# Patient Record
Sex: FEMALE | Race: WHITE | Employment: OTHER | URBAN - METROPOLITAN AREA
[De-identification: names, ages, dates, MRNs, and addresses within clinical notes are randomized per-mention and may not be internally consistent; named-entity substitution may affect disease eponyms.]

---

## 2020-07-14 ENCOUNTER — APPOINTMENT (OUTPATIENT)
Dept: PHYSICAL THERAPY | Age: 60
End: 2020-07-14

## 2022-06-29 ENCOUNTER — APPOINTMENT (OUTPATIENT)
Dept: PHYSICAL THERAPY | Age: 62
End: 2022-06-29

## 2022-07-06 ENCOUNTER — APPOINTMENT (OUTPATIENT)
Dept: PHYSICAL THERAPY | Age: 62
End: 2022-07-06
Payer: OTHER GOVERNMENT

## 2022-07-11 ENCOUNTER — HOSPITAL ENCOUNTER (OUTPATIENT)
Dept: PHYSICAL THERAPY | Age: 62
Discharge: HOME | End: 2022-07-11
Payer: OTHER GOVERNMENT

## 2022-07-11 PROCEDURE — 97014 ELECTRIC STIMULATION THERAPY: CPT | Performed by: PHYSICAL THERAPIST

## 2022-07-11 PROCEDURE — 97162 PT EVAL MOD COMPLEX 30 MIN: CPT | Performed by: PHYSICAL THERAPIST

## 2022-07-11 PROCEDURE — 97535 SELF CARE MNGMENT TRAINING: CPT | Performed by: PHYSICAL THERAPIST

## 2022-07-11 NOTE — PROGRESS NOTES
PT DAILY TREATMENT NOTE     Patient Name: Mery Garces  Date:2022  : 1960  [x]  Patient  Verified  Payor: JOAO / Plan: Eliu Ra / Product Type: [employer] /    In time:917  Out time:1014  Total Treatment Time (min): 62  Visit #: 1 of     Treatment Area: Carpal tunnel syndrome, right upper limb [G56.01]    SUBJECTIVE  Pain Level (0-10 scale): 2/10 R wrist 5/10 R elbow  Any medication changes, allergies to medications, adverse drug reactions, diagnosis change, or new procedure performed?: [x] No    [] Yes (see summary sheet for update)  Subjective functional status/changes:   [] No changes reported  Pt is a 63year old female with subjective complaints of R wrist pain following a carpal tunnel release in 2020. She notes that when the stitches came out she had intense pain and burning sensation in her R wrist and hand and then at the same pain she started developing R elbow pain after painting. Currently pain in her R wrist a 2/10 and a 5/10 in her R elbow. Functional limitations include she has pain in the middle of the night, in the morning is worse, rowing, gripping, and lifting. She denies anything that really helps her pain. She has not had recent imaging studies. Negative for red flags. FOTO: 57/100.      OBJECTIVE    Modality rationale: decrease inflammation, decrease pain and increase tissue extensibility to improve the patients ability to improve post session soreness    Min Type Additional Details   15 [x] Estim:  [x]Unatt       []IFC  [x]Premod                        []Other:  [x]w/ice   []w/heat  Position: seated  Location: r wrist extensor wad    [] Estim: []Att    []TENS instruct  []NMES                    []Other:  []w/US   []w/ice   []w/heat  Position:  Location:    []  Traction: [] Cervical       []Lumbar                       [] Prone          []Supine                       []Intermittent   []Continuous Lbs:  [] before manual  [] after manual []  Ultrasound: []Continuous   [] Pulsed                           []1MHz   []3MHz W/cm2:  Location:    []  Iontophoresis with dexamethasone         Location: [] Take home patch   [] In clinic    []  Ice     []  heat  []  Ice massage  []  Laser   []  Anodyne Position:  Location:    []  Laser with stim  []  Other:  Position:  Location:    []  Vasopneumatic Device  Pre-treatment girth:   Post-treatment girth:   Measured at landmark location:  Pressure:       [] lo [] med [] hi   Temperature: [] lo [] med [] hi   [] Skin assessment post-treatment:  []intact []redness- no adverse reaction    []redness - adverse reaction:     20 min [x]Eval                  []Re-Eval       10 min Manual Therapy:  DTM to the R wrist extensor wad with passive stretch   The manual therapy interventions were performed at a separate and distinct time from the therapeutic activities interventions. Rationale: decrease pain, increase tissue extensibility and decrease trigger points to improve    12 min Self Care: Pt educated on type and wear protocol for wrist extension splint to wear when sleeping or performing knitting   Rationale:    to decrease pain to improve the patients ability to hobbies without increased stress to the R elbow/pain          With   [] TE   [] TA   [] neuro   [] other: Patient Education: [x] Review HEP    [] Progressed/Changed HEP based on:   [] positioning   [] body mechanics   [] transfers   [] heat/ice application    [] other:      Other Objective/Functional Measures:   Upon evaluation, pt sits with good posture. AROM of the R elbow is full with pain at end range. R wrist AROM is WNL. R wrist strength: 4/5.  strength: R: 65.4# : 76.1#. Noted severe pain over the R epicondyle and wrist extensor wad.         Fall Risk Assessment: Does the patient have a fear of falling? no    Pain Level (0-10 scale) post treatment: stiff    ASSESSMENT/Changes in Function:  [x]  See Plan of Care  []  See progress note/recertification  []  See Discharge Summary         Progress towards goals / Updated goals:  PER POC     PLAN  []  Upgrade activities as tolerated     [x]  Continue plan of care  []  Update interventions per flow sheet       []  Discharge due to:_  [x]  Other: PT 2-3x/week for 4 weeks    Justification for Eval Code Complexity:  Patient History : chronicity, neck pain, CTS   Examination see exam   Clinical Presentation: evolving  Clinical Decision Making : FOTO : 62 /100       Blessing Kay DPT 7/11/2022  9:21 AM    Future Appointments   Date Time Provider Boo Lock   7/19/2022 10:45 AM Pat Sargent PTA [facility]

## 2022-07-13 NOTE — PROGRESS NOTES
[address] [facility] AT [facility]  [address] [address],[address], [place], [place]  Phone [phone]  Fax [phone] / [address] [address] PHYSICAL THERAPY SERVICES  Patient Name: Kristan Boyd : 1960   Medical   Diagnosis: Carpal tunnel syndrome, right upper limb [G56.01] Treatment Diagnosis: R elbow/wrist pain   Onset Date:      Referral Source: Divya Coyle Start of Care [facility]): 2022   Prior Hospitalization: See medical history Provider #: [id number]   Prior Level of Function: IND, RHD, likes to knit/sew   Comorbidities: None reported   Medications: Verified on Patient Summary List   The Plan of Care and following information is based on the information from the initial evaluation.   =================================================================================  Assessment / key information:  Pt is a 63year old female with subjective complaints of R wrist pain following a carpal tunnel release in 2020. She notes that when the stitches came out she had intense pain and burning sensation in her R wrist and hand and then at the same pain she started developing R elbow pain after painting. Currently pain in her R wrist a 2/10 and a 5/10 in her R elbow. Functional limitations include she has pain in the middle of the night, in the morning is worse, rowing, gripping, and lifting. She denies anything that really helps her pain. She has not had recent imaging studies. Negative for red flags. FOTO: 57/100. Upon evaluation, pt sits with good posture. AROM of the R elbow is full with pain at end range. R wrist AROM is WNL. R wrist strength: 4/5.  strength: R: 65.4# : 76.1#. Noted severe pain over the R epicondyle and wrist extensor wad. Pt would benefit from a course of skilled PT to address above deficits to maximize use of the R UE with reduced pain. =================================================================================  Eval Complexity: History: MEDIUM  Complexity : 1-2 comorbidities / personal factors will impact the outcome/ POC Exam:HIGH Complexity : 4+ Standardized tests and measures addressing body structure, function, activity limitation and / or participation in recreation  Presentation: MEDIUM Complexity : Evolving with changing characteristics  Clinical Decision Making:MEDIUM Complexity : FOTO score of 26-74Overall Complexity:MEDIUM  Problem List: pain affecting function, decrease strength, decrease ADL/ functional abilitiies, decrease activity tolerance and decrease flexibility/ joint mobility   Treatment Plan may include any combination of the following: Therapeutic exercise, Therapeutic activities, Neuromuscular re-education, Physical agent/modality, Manual therapy, Patient education and Self Care training  Patient / Family readiness to learn indicated by: asking questions and trying to perform skills  Persons(s) to be included in education: patient (P)  Barriers to Learning/Limitations: None  Measures taken:    Patient Goal (s): Relief   Patient self reported health status: excellent  Rehabilitation Potential: good   Short Term Goals: To be accomplished in  1  weeks:  1. Pt will be IND and compliant with HEP for self-management of symptoms. 2. Pt will be compliant with wearing R wrist brace to reduce overuse with daily activities for pain management.  Long Term Goals: To be accomplished in  4  weeks:  1. Pt will improve R  strength to 80 lbs to improve ability to open a tight jar. 2. Pt will improve R wrist strength to 5/5 to improve ADL tolerance. 3. Pt will report a 50% improvement in pain to be able to perform hobbies without restrictions. 4. Pt will improve FOTO score to at least 71/100 as a functional indicator of improved functional mobility.    Frequency / Duration:   Patient to be seen  2-3  times per week for 4 weeks: (All LTG as above will be assessed and updated every 10 visits or 30 days and progressed as needed)    Patient / Caregiver education and instruction: exercises  Therapist Signature: Rula Monzon, DPT Date: 63/86/2711   Certification Period: NA Time:    ===========================================================================================  I certify that the above Physical Therapy Services are being furnished while the patient is under my care. I agree with the treatment plan and certify that this therapy is necessary. Physician Signature:        Date:       Time:     Please sign and return to [facility] or you may fax the signed copy to [phone]. Thank you.   Nawaf Allen PA-C

## 2022-07-15 ENCOUNTER — HOSPITAL ENCOUNTER (OUTPATIENT)
Dept: PHYSICAL THERAPY | Age: 62
Discharge: HOME | End: 2022-07-15
Payer: OTHER GOVERNMENT

## 2022-07-15 PROCEDURE — 97140 MANUAL THERAPY 1/> REGIONS: CPT

## 2022-07-15 PROCEDURE — 97530 THERAPEUTIC ACTIVITIES: CPT

## 2022-07-15 PROCEDURE — 97110 THERAPEUTIC EXERCISES: CPT

## 2022-07-15 NOTE — PROGRESS NOTES
PT DAILY TREATMENT NOTE     Patient Name: Army Crawford  Date:7/15/2022  : 1960  [x]  Patient  Verified  Payor: JOAO / Plan: Yung Posey 74 / Product Type:  /    In time:916  Out time: 10:08   Total Treatment Time (min): 52   Visit #: 2 of     Treatment Area: Carpal tunnel syndrome, right upper limb [G56.01]    SUBJECTIVE  Pain Level (0-10 scale): 0/10 wrist, 4/10 elbow   Any medication changes, allergies to medications, adverse drug reactions, diagnosis change, or new procedure performed?: [x] No    [] Yes (see summary sheet for update)  Subjective functional status/changes:   [] No changes reported  The patient reports she was performing right wrist extensor stretches. She states she forgot to bring her brace.      OBJECTIVE    Modality rationale: decrease inflammation, decrease pain and increase tissue extensibility to improve the patients ability to improve post session soreness    Min Type Additional Details   x  [x] Estim:  [x]Unatt       []IFC  [x]Premod                        []Other:  [x]w/ice   []w/heat  Position: seated  Location: r wrist extensor wad    [] Estim: []Att    []TENS instruct  []NMES                    []Other:  []w/US   []w/ice   []w/heat  Position:  Location:    []  Traction: [] Cervical       []Lumbar                       [] Prone          []Supine                       []Intermittent   []Continuous Lbs:  [] before manual  [] after manual    []  Ultrasound: []Continuous   [] Pulsed                           []1MHz   []3MHz W/cm2:  Location:    []  Iontophoresis with dexamethasone         Location: [] Take home patch   [] In clinic    []  Ice     []  heat  []  Ice massage  []  Laser   []  Anodyne Position:  Location:    []  Laser with stim  []  Other:  Position:  Location:    []  Vasopneumatic Device  Pre-treatment girth:   Post-treatment girth:   Measured at landmark location:  Pressure:       [] lo [] med [] hi   Temperature: [] lo [] med [] hi [] Skin assessment post-treatment:  []intact []redness- no adverse reaction    []redness - adverse reaction:     21 min Therapeutic Exercise:  [] See flow sheet :  UBE   Wrist extensor stretches   Patient education: anatomy of wrist extensors, tendon healing, k-taping   HEP DEVELOPMENT AND REVIEW    Rationale: increase ROM and increase strength to improve the patients ability to perform household duties     8 min Therapeutic Activity:  []  See flow sheet :  Gripper - supinated and pronated   Wrist PRE   Rationale: increase ROM and increase strength  to improve the patients ability to perform ADL's     x min Neuromuscular Re-education:  []  See flow sheet :   Rationale: increase ROM and increase strength  to improve the patients lifting tolerance     23 min Manual Therapy:  DTM to the R wrist extensor wad with passive stretch; k-tape to right elbow   The manual therapy interventions were performed at a separate and distinct time from the therapeutic activities interventions. Rationale: decrease pain, increase tissue extensibility and decrease trigger points to improve    x min Self Care: Pt educated on type and wear protocol for wrist extension splint to wear when sleeping or performing knitting   Rationale:    to decrease pain to improve the patients ability to hobbies without increased stress to the R elbow/pain          With   [x] TE   [] TA   [] neuro   [] other: Patient Education: [x] Review HEP    [] Progressed/Changed HEP based on:   [] positioning   [] body mechanics   [] transfers   [] heat/ice application    [x] other: anatomy of wrist extensors, tendon healing     Other Objective/Functional Measures:   Initiated per POC         Pain Level (0-10 scale) post treatment: sore    ASSESSMENT/Changes in Function:  The patient presents for first f/u since IE. She was challenged by gripping in pronated position. Increased tenderness throughout extensor wad noted during manual treatment. Updated HEP.  Progress as tolerated nv. Patient will continue to benefit from skilled PT services to modify and progress therapeutic interventions, address functional mobility deficits, address ROM deficits, address strength deficits, analyze and address soft tissue restrictions, analyze and cue movement patterns and analyze and modify body mechanics/ergonomics to attain remaining goals. [x]  See Plan of Care  []  See progress note/recertification  []  See Discharge Summary         Progress towards goals / Updated goals: · Short Term Goals: To be accomplished in  1  weeks:  1. Pt will be IND and compliant with HEP for self-management of symptoms. Current: good compliance with wrist extensor stretches 07/15/2022  2. Pt will be compliant with wearing R wrist brace to reduce overuse with daily activities for pain management. · Long Term Goals: To be accomplished in  4  weeks:  1. Pt will improve R  strength to 80 lbs to improve ability to open a tight jar. 2. Pt will improve R wrist strength to 5/5 to improve ADL tolerance. 3. Pt will report a 50% improvement in pain to be able to perform hobbies without restrictions. 4. Pt will improve FOTO score to at least 71/100 as a functional indicator of improved functional mobility.           PLAN  []  Upgrade activities as tolerated     [x]  Continue plan of care  []  Update interventions per flow sheet       []  Discharge due to:_  [x]  Other: progress as tolerated       Shoshana Lefort, PT 7/15/2022  9:21 AM    Future Appointments   Date Time Provider Boo Lock   7/19/2022  7:00 AM Missy Olmstead DPT [facility]   7/21/2022  7:45 AM Missy Olmstead DPT [facility]   7/26/2022  7:45 AM Missy Olmstead DPT [facility]   7/29/2022  7:00 AM Missy Olmstead DPT [facility]   8/2/2022  7:45 AM Missy Olmstead DPT [facility]   8/5/2022  8:30 AM Missy Olmstead DPT [facility]   8/9/2022  7:45 AM Tawana Boogie Ma, DPT [facility]

## 2022-07-18 ENCOUNTER — APPOINTMENT (OUTPATIENT)
Dept: PHYSICAL THERAPY | Age: 62
End: 2022-07-18
Payer: OTHER GOVERNMENT

## 2022-07-19 ENCOUNTER — APPOINTMENT (OUTPATIENT)
Dept: PHYSICAL THERAPY | Age: 62
End: 2022-07-19
Payer: OTHER GOVERNMENT

## 2022-07-21 ENCOUNTER — HOSPITAL ENCOUNTER (OUTPATIENT)
Dept: PHYSICAL THERAPY | Age: 62
Discharge: HOME | End: 2022-07-21
Payer: OTHER GOVERNMENT

## 2022-07-21 PROCEDURE — 97140 MANUAL THERAPY 1/> REGIONS: CPT | Performed by: PHYSICAL THERAPIST

## 2022-07-21 PROCEDURE — 97110 THERAPEUTIC EXERCISES: CPT | Performed by: PHYSICAL THERAPIST

## 2022-07-21 PROCEDURE — 97035 APP MDLTY 1+ULTRASOUND EA 15: CPT | Performed by: PHYSICAL THERAPIST

## 2022-07-21 NOTE — PROGRESS NOTES
PT DAILY TREATMENT NOTE     Patient Name: James Ort  Date:2022  : 1960  [x]  Patient  Verified  Payor: JOAO / Plan: Yung Posey 74 / Product Type:  /    In time:745  Out time: 847  Total Treatment Time (min): 62  Visit #: 3 of     Treatment Area: Carpal tunnel syndrome, right upper limb [G56.01]    SUBJECTIVE  Pain Level (0-10 scale): 0/10 wrist, 4/10 elbow   Any medication changes, allergies to medications, adverse drug reactions, diagnosis change, or new procedure performed?: [x] No    [] Yes (see summary sheet for update)  Subjective functional status/changes:   [] No changes reported  Pt reports that her elbow still hurts. She notes that her wrist is feeling good.       OBJECTIVE    Modality rationale: decrease inflammation, decrease pain and increase tissue extensibility to improve the patients ability to improve post session soreness    Min Type Additional Details   x  [x] Estim:  [x]Unatt       []IFC  [x]Premod                        []Other:  [x]w/ice   []w/heat  Position: seated  Location: r wrist extensor wad    [] Estim: []Att    []TENS instruct  []NMES                    []Other:  []w/US   []w/ice   []w/heat  Position:  Location:    []  Traction: [] Cervical       []Lumbar                       [] Prone          []Supine                       []Intermittent   []Continuous Lbs:  [] before manual  [] after manual   8 [x]  Ultrasound: [x]Continuous   [] Pulsed                           [x]1MHz   []3MHz W/cm2: 8  Location: R elbow extensor wad    []  Iontophoresis with dexamethasone         Location: [] Take home patch   [] In clinic   With development of HEP [x]  Ice     []  heat  []  Ice massage  []  Laser   []  Anodyne Position: seated  Location: R elbow    []  Laser with stim  []  Other:  Position:  Location:    []  Vasopneumatic Device  Pre-treatment girth:   Post-treatment girth:   Measured at landmark location:  Pressure:       [] lo [] med [] hi Temperature: [] lo [] med [] hi   [] Skin assessment post-treatment:  []intact []redness- no adverse reaction    []redness - adverse reaction:     39 min Therapeutic Exercise:  [] See flow sheet :  UBE   Wrist extensor stretches   Gripper  Wrist PREs  HEP DEVELOPMENT    Rationale: increase ROM and increase strength to improve the patients ability to perform household duties      x min Neuromuscular Re-education:  []  See flow sheet :   Rationale: increase ROM and increase strength  to improve the patients lifting tolerance     15 min Manual Therapy:  DTM to the R wrist extensor wad with passive stretch; The manual therapy interventions were performed at a separate and distinct time from the therapeutic activities interventions. Rationale: decrease pain, increase tissue extensibility and decrease trigger points to improve    x min Self Care: Pt educated on type and wear protocol for wrist extension splint to wear when sleeping or performing knitting   Rationale:     to decrease pain  to improve the patients ability to hobbies without increased stress to the R elbow/pain          With   [x] TE   [] TA   [] neuro   [] other: Patient Education: [x] Review HEP    [] Progressed/Changed HEP based on:   [] positioning   [] body mechanics   [] transfers   [] heat/ice application    [x] other: anatomy of wrist extensors, tendon healing     Other Objective/Functional Measures:   + tightness in the R wrist extensor wad  Updated HEP    Pain Level (0-10 scale) post treatment: sore    ASSESSMENT/Changes in Function:  Pt reports that the travel is too much and would like to transition to HEP. Pt was educated at length on self-massage and pt reports that she will bring her  next visit to practice. Updated HEP and educated pt to trial a elbow brace as she had an allergic reaction to the tape. Assess effects NV.      Patient will continue to benefit from skilled PT services to modify and progress therapeutic interventions, address functional mobility deficits, address ROM deficits, address strength deficits, analyze and address soft tissue restrictions, analyze and cue movement patterns and analyze and modify body mechanics/ergonomics to attain remaining goals. [x]  See Plan of Care  []  See progress note/recertification  []  See Discharge Summary         Progress towards goals / Updated goals:  Short Term Goals: To be accomplished in  1  weeks:  1. Pt will be IND and compliant with HEP for self-management of symptoms. Current: good compliance with wrist extensor stretches 07/15/2022  2. Pt will be compliant with wearing R wrist brace to reduce overuse with daily activities for pain management. not met will get a tennis elbow brace 7/21/22  Long Term Goals: To be accomplished in  4  weeks:  1. Pt will improve R  strength to 80 lbs to improve ability to open a tight jar. 2. Pt will improve R wrist strength to 5/5 to improve ADL tolerance. 3. Pt will report a 50% improvement in pain to be able to perform hobbies without restrictions. 4. Pt will improve FOTO score to at least 71/100 as a functional indicator of improved functional mobility.       PLAN  []  Upgrade activities as tolerated     [x]  Continue plan of care  []  Update interventions per flow sheet       []  Discharge due to:_  [x]  Other: progress as tolerated, assess effects of updated HEP       Lucy Lr DPT 7/21/2022  9:00 AM    Future Appointments   Date Time Provider Boo Lock   7/26/2022  7:45 AM Dede Hernandez DPT [facility]   7/29/2022  7:00 AM Dede Hernandez DPT [facility]   8/2/2022  7:45 AM Dede Hernandez DPT [facility]   8/5/2022  8:30 AM Dede Hernandez DPT [facility]   8/9/2022  7:45 AM Jackelin Pittman DPT [facility]

## 2022-07-26 ENCOUNTER — HOSPITAL ENCOUNTER (OUTPATIENT)
Dept: PHYSICAL THERAPY | Age: 62
Discharge: HOME | End: 2022-07-26
Payer: OTHER GOVERNMENT

## 2022-07-26 PROCEDURE — 97035 APP MDLTY 1+ULTRASOUND EA 15: CPT | Performed by: PHYSICAL THERAPIST

## 2022-07-26 PROCEDURE — 97110 THERAPEUTIC EXERCISES: CPT | Performed by: PHYSICAL THERAPIST

## 2022-07-26 PROCEDURE — 97140 MANUAL THERAPY 1/> REGIONS: CPT | Performed by: PHYSICAL THERAPIST

## 2022-07-26 NOTE — PROGRESS NOTES
PT DAILY TREATMENT NOTE     Patient Name: Agustín Botello  Date:2022  : 1960  [x]  Patient  Verified  Payor:  / Plan: Yung Posey 76 / Product Type:  /    In time:752 Out time: 840  Total Treatment Time (min): 48  Visit #: 4 of     Treatment Area: Carpal tunnel syndrome, right upper limb [G56.01]    SUBJECTIVE  Pain Level (0-10 scale): 0/10 wrist, 2-3/10 elbow   Any medication changes, allergies to medications, adverse drug reactions, diagnosis change, or new procedure performed?: [x] No    [] Yes (see summary sheet for update)  Subjective functional status/changes:   [] No changes reported  Pt reports that she got an elbow brace and its been helping. She reports that for some reason she is sore today but she is pleased with how well her elbow has been feeling.      OBJECTIVE    Modality rationale: decrease inflammation, decrease pain and increase tissue extensibility to improve the patients ability to improve post session soreness    Min Type Additional Details   x  [x] Estim:  [x]Unatt       []IFC  [x]Premod                        []Other:  [x]w/ice   []w/heat  Position: seated  Location: r wrist extensor wad    [] Estim: []Att    []TENS instruct  []NMES                    []Other:  []w/US   []w/ice   []w/heat  Position:  Location:    []  Traction: [] Cervical       []Lumbar                       [] Prone          []Supine                       []Intermittent   []Continuous Lbs:  [] before manual  [] after manual   8 [x]  Ultrasound: [x]Continuous   [] Pulsed                           [x]1MHz   []3MHz W/cm2: 8  Location: R elbow extensor wad    []  Iontophoresis with dexamethasone         Location: [] Take home patch   [] In clinic   With development of HEP [x]  Ice     []  heat  []  Ice massage  []  Laser   []  Anodyne Position: seated  Location: R elbow    []  Laser with stim  []  Other:  Position:  Location:    []  Vasopneumatic Device  Pre-treatment girth: Post-treatment girth:   Measured at landmark location:  Pressure:       [] lo [] med [] hi   Temperature: [] lo [] med [] hi   [] Skin assessment post-treatment:  []intact []redness- no adverse reaction    []redness - adverse reaction:     15 min Therapeutic Exercise:  [] See flow sheet :  UBE   Wrist extensor stretches   Gripper  Wrist PREs  R 2-4 digit extension  Bicep curls x3 way   Rationale: increase ROM and increase strength to improve the patients ability to perform household duties      x min Neuromuscular Re-education:  []  See flow sheet :   Rationale: increase ROM and increase strength  to improve the patients lifting tolerance     25 min Manual Therapy:  DTM to the R wrist extensor wad with passive stretch; with patient/spouse education on how to perform manual interventions at home. The manual therapy interventions were performed at a separate and distinct time from the therapeutic activities interventions. Rationale: decrease pain, increase tissue extensibility and decrease trigger points to improve    x min Self Care: Pt educated on type and wear protocol for wrist extension splint to wear when sleeping or performing knitting   Rationale:     to decrease pain  to improve the patients ability to hobbies without increased stress to the R elbow/pain          With   [x] TE   [] TA   [] neuro   [] other: Patient Education: [x] Review HEP    [] Progressed/Changed HEP based on:   [] positioning   [] body mechanics   [] transfers   [] heat/ice application    [x] other: anatomy of wrist extensors, tendon healing     Other Objective/Functional Measures:   R: 65.2#  Increased tenderness and tightness in the R extensor wad over the R lateral epicondyle    Pain Level (0-10 scale) post treatment: stiff     ASSESSMENT/Changes in Function:  Pt tolerated all therex without increased pain. Continued pain in the R elbow limiting activity tolerance however, pt reports brace helps symptoms.  Pt would like to decrease frequency of visits secondary to drive time.  present to learn how to perform manual interventions at home. Pts spouse demonstrated ability to perform. Maintained R  strength noted. Pt to FU in 2 weeks for assessment. Patient will continue to benefit from skilled PT services to modify and progress therapeutic interventions, address functional mobility deficits, address ROM deficits, address strength deficits, analyze and address soft tissue restrictions, analyze and cue movement patterns and analyze and modify body mechanics/ergonomics to attain remaining goals. [x]  See Plan of Care  []  See progress note/recertification  []  See Discharge Summary         Progress towards goals / Updated goals:  Short Term Goals: To be accomplished in  1  weeks:  1. Pt will be IND and compliant with HEP for self-management of symptoms. Current: good compliance with wrist extensor stretches 07/15/2022  2. Pt will be compliant with wearing R wrist brace to reduce overuse with daily activities for pain management. met pt reports that she got an elbow brace 7/26/22  Long Term Goals: To be accomplished in  4  weeks:  1. Pt will improve R  strength to 80 lbs to improve ability to open a tight jar. Limited, no change 7/26/22  2. Pt will improve R wrist strength to 5/5 to improve ADL tolerance. 3. Pt will report a 50% improvement in pain to be able to perform hobbies without restrictions. 4. Pt will improve FOTO score to at least 71/100 as a functional indicator of improved functional mobility.       PLAN  []  Upgrade activities as tolerated     [x]  Continue plan of care  []  Update interventions per flow sheet       []  Discharge due to:_  [x]  Other: check goals STEWART Schumacher DPT 7/26/2022  9:10 AM    Future Appointments   Date Time Provider Boo Lock   8/2/2022  7:45 AM She Arizaident, DPT [facility]   8/5/2022  8:30 AM She Butts DPT [facility]   8/9/2022  7:45 AM Reanna Garcia, DPT [facility]

## 2022-07-29 ENCOUNTER — APPOINTMENT (OUTPATIENT)
Dept: PHYSICAL THERAPY | Age: 62
End: 2022-07-29
Payer: OTHER GOVERNMENT

## 2022-08-02 ENCOUNTER — APPOINTMENT (OUTPATIENT)
Dept: PHYSICAL THERAPY | Age: 62
End: 2022-08-02
Payer: OTHER GOVERNMENT

## 2022-08-05 ENCOUNTER — APPOINTMENT (OUTPATIENT)
Dept: PHYSICAL THERAPY | Age: 62
End: 2022-08-05
Payer: OTHER GOVERNMENT

## 2022-08-09 ENCOUNTER — APPOINTMENT (OUTPATIENT)
Dept: PHYSICAL THERAPY | Age: 62
End: 2022-08-09
Payer: OTHER GOVERNMENT

## 2022-08-11 ENCOUNTER — HOSPITAL ENCOUNTER (OUTPATIENT)
Dept: PHYSICAL THERAPY | Age: 62
Discharge: HOME | End: 2022-08-11
Payer: OTHER GOVERNMENT

## 2022-08-11 PROCEDURE — 97140 MANUAL THERAPY 1/> REGIONS: CPT | Performed by: PHYSICAL THERAPIST

## 2022-08-11 PROCEDURE — 97110 THERAPEUTIC EXERCISES: CPT | Performed by: PHYSICAL THERAPIST

## 2022-08-11 PROCEDURE — 97035 APP MDLTY 1+ULTRASOUND EA 15: CPT | Performed by: PHYSICAL THERAPIST

## 2022-08-11 NOTE — PROGRESS NOTES
PT DAILY TREATMENT NOTE     Patient Name: Mery Garces  Date:2022  : 1960  [x]  Patient  Verified  Payor:  / Plan: Yung Posey 76 / Product Type:  /    In time:745 Out time: 835  Total Treatment Time (min): 50  Visit #: 5 of     Treatment Area: Carpal tunnel syndrome, right upper limb [G56.01]    SUBJECTIVE  Pain Level (0-10 scale): 0/10   Any medication changes, allergies to medications, adverse drug reactions, diagnosis change, or new procedure performed?: [x] No    [] Yes (see summary sheet for update)  Subjective functional status/changes:   [] No changes reported  Pt reports 75% improvement of symptoms since SOC. She reports improvement in the frequency of pain and reduction of stiffness. Functional limitations include weeding, using a shovel, and lifting. She reports that she gets a flare up 2-3 times a week. She is still taking advil for her symptoms when she has the pain.      OBJECTIVE    Modality rationale: decrease inflammation, decrease pain and increase tissue extensibility to improve the patients ability to improve post session soreness    Min Type Additional Details   x  [x] Estim:  [x]Unatt       []IFC  [x]Premod                        []Other:  [x]w/ice   []w/heat  Position: seated  Location: r wrist extensor wad    [] Estim: []Att    []TENS instruct  []NMES                    []Other:  []w/US   []w/ice   []w/heat  Position:  Location:    []  Traction: [] Cervical       []Lumbar                       [] Prone          []Supine                       []Intermittent   []Continuous Lbs:  [] before manual  [] after manual   8 [x]  Ultrasound: [x]Continuous   [] Pulsed                           [x]1MHz   []3MHz W/cm2: 8  Location: R elbow extensor wad    []  Iontophoresis with dexamethasone         Location: [] Take home patch   [] In clinic   8 [x]  Ice     []  heat  []  Ice massage  []  Laser   []  Anodyne Position: seated  Location: R elbow    [] Laser with stim  []  Other:  Position:  Location:    []  Vasopneumatic Device  Pre-treatment girth:   Post-treatment girth:   Measured at landmark location:  Pressure:       [] lo [] med [] hi   Temperature: [] lo [] med [] hi   [] Skin assessment post-treatment:  []intact []redness- no adverse reaction    []redness - adverse reaction:     24 min Therapeutic Exercise:  [] See flow sheet :  UBE   Wrist extensor stretches   Gripper  PT reassessment/FOTO   Rationale: increase ROM and increase strength to improve the patients ability to perform household duties      x min Neuromuscular Re-education:  []  See flow sheet :   Rationale: increase ROM and increase strength  to improve the patients lifting tolerance     10 min Manual Therapy:  DTM to the R wrist extensor wad with passive stretch; with patient/spouse education on how to perform manual interventions at home. The manual therapy interventions were performed at a separate and distinct time from the therapeutic activities interventions. Rationale: decrease pain, increase tissue extensibility and decrease trigger points to improve    x min Self Care: Pt educated on type and wear protocol for wrist extension splint to wear when sleeping or performing knitting   Rationale:     to decrease pain  to improve the patients ability to hobbies without increased stress to the R elbow/pain          With   [x] TE   [] TA   [] neuro   [] other: Patient Education: [x] Review HEP    [] Progressed/Changed HEP based on:   [] positioning   [] body mechanics   [] transfers   [] heat/ice application    [x] other: anatomy of wrist extensors, tendon healing     Other Objective/Functional Measures:    FOTO: improved to 72/100 from 57/100   strength: 86.6#  R wrist strength: 5/5 in all planes without pain  Tenderness over the proximal attachment the proximal attachment of the R wrist extensor wad      Pain Level (0-10 scale) post treatment: stiff    ASSESSMENT/Changes in Function:  Upon reassessment, pt presents with improvements in R UE strength as well as  strength secondary to overall reduction in pain. She continues to present with tenderness over the R lateral epicondyle. She reports that she is compliant with HEP and is able to continue to manage symptoms IND at home. Pt would like to be placed on hold for 30 days to continue home management. Pt will return if needed 1-2x/week for 4 weeks if pain levels increase. Patient will continue to benefit from skilled PT services to modify and progress therapeutic interventions, address functional mobility deficits, address ROM deficits, address strength deficits, analyze and address soft tissue restrictions, analyze and cue movement patterns and analyze and modify body mechanics/ergonomics to attain remaining goals. [x]  See Plan of Care  []  See progress note/recertification  []  See Discharge Summary         Progress towards goals / Updated goals:  Short Term Goals: To be accomplished in  1  weeks:  1. Pt will be IND and compliant with HEP for self-management of symptoms. Current: good compliance 8/11/22  2. Pt will be compliant with wearing R wrist brace to reduce overuse with daily activities for pain management. met pt reports that she got an elbow brace 7/26/22  Long Term Goals: To be accomplished in  4  weeks:  1. Pt will improve R  strength to 80 lbs to improve ability to open a tight jar. Met 8/11/22  2. Pt will improve R wrist strength to 5/5 to improve ADL tolerance. Met 8/11/22  3. Pt will report a 50% improvement in pain to be able to perform hobbies without restrictions. Met 75% 8/11/22  4. Pt will improve FOTO score to at least 71/100 as a functional indicator of improved functional mobility.   met 8/11/22    PLAN  []  Upgrade activities as tolerated     [x]  Continue plan of care  []  Update interventions per flow sheet       []  Discharge due to:_  [x]  Other: Pt to trial IND management for 30 days and will FU for 1 visits. Pt to come 1-2x/week for 4 weeks if pain returns. Akbar Huffman DPT 8/11/2022  1029 AM    No future appointments.

## 2022-08-11 NOTE — PROGRESS NOTES
[address] [facility] AT [facility]  [address] [place] [address],[address], [place]  Phone [phone]  Fax [phone]  PROGRESS NOTE  Patient Name: Jaskaran Lopez : 1960   Treatment/Medical Diagnosis: Carpal tunnel syndrome, right upper limb [G56.01]   Referral Source: [facility]     Date of Initial Visit: 22 Attended Visits: 5 Missed Visits: 0     SUMMARY OF TREATMENT  Pt seen for 5 visits for R elbow pain. Therapy included therex for strengthening/flexibility, manual to improve  tissue extensibility, and modalities for pain management. CURRENT STATUS  Pt reports 75% [place] of [place] [facility] She reports improvement in the frequency of pain and reduction of stiffness. Functional limitations include weeding, using a shovel, and lifting. She reports that she gets a flare up 2-3 times a week. She is still taking advil for her symptoms when she has the pain. Upon reassessment, pt presents with improvements in R UE strength as well as  strength secondary to overall reduction in pain. She continues to present with tenderness over the R lateral epicondyle. She reports that she is compliant with HEP and is able to continue to manage symptoms IND at home. Pt would like to be placed on hold for 30 days to continue home management. Pt will return if needed 1-2x/week for 4 weeks if pain levels increase. Other Objective/Functional Measures: FOTO: improved to 72/100 from 57/100   strength: 86.6#  R wrist strength: 5/5 in all planes without pain  Tenderness over the proximal attachment the proximal attachment of the R wrist extensor wad       Other Objective/Functional Measures: measurements taken at [facility] on 22  Upon evaluation, pt sits with good posture. AROM of the R elbow is full with pain at end range. R wrist AROM is WNL. R wrist strength: 4/5.   strength: R: 65.4# : 76.1#. Noted severe pain over the R epicondyle and wrist extensor wad. Goal/Measure of Progress Goal Met? Short Term Goals: To be accomplished in  1  weeks:  1. Pt will be IND and compliant with HEP for self-management of symptoms. Current: good compliance 8/11/22  2. Pt will be compliant with wearing R wrist brace to reduce overuse with daily activities for pain management. met pt reports that she got an elbow brace 7/26/22  Long Term Goals: To be accomplished in  4  weeks:  1. Pt will improve R  strength to 80 lbs to improve ability to open a tight jar. Met 8/11/22  2. Pt will improve R wrist strength to 5/5 to improve ADL tolerance. Met 8/11/22  3. Pt will report a 50% improvement in pain to be able to perform hobbies without restrictions. Met 75% 8/11/22  4. Pt will improve FOTO score to at least 71/100 as a functional indicator of improved functional mobility. met 8/11/22    New Goals to be achieved in __4__  weeks:  Pt will report 90% improvement of symptoms to return to gardening symptom free. Pt will continue to be IND and complaint with HEP for self-management of symptoms. RECOMMENDATIONS   Pt to trial IND management for 30 days and will FU for 1 visits. Pt to come 1-2x/week for 4 weeks if pain returns. If you have any questions/comments please contact us directly at [phone]. Thank you for allowing us to assist in the care of your patient. Therapist Signature: Aline Gilman DPT Date: 8/11/2022     Time: 10:30 AM   NOTE TO PHYSICIAN:  PLEASE COMPLETE THE ORDERS BELOW AND FAX TO   [facility]: [phone].   If you are unable to process this request in 24 hours please contact our office: [phone].    ___ I have read the above report and request that my patient continue as recommended.   ___ I have read the above report and request that my patient continue therapy with the following changes/special instructions:_________________________________________________________   ___ I have read the above report and request that my patient be discharged from therapy.      Physician Signature:        Date:       Time:    Rei Lockhart PA-C

## 2022-09-07 ENCOUNTER — APPOINTMENT (OUTPATIENT)
Dept: PHYSICAL THERAPY | Age: 62
End: 2022-09-07